# Patient Record
Sex: FEMALE | Race: WHITE | URBAN - METROPOLITAN AREA
[De-identification: names, ages, dates, MRNs, and addresses within clinical notes are randomized per-mention and may not be internally consistent; named-entity substitution may affect disease eponyms.]

---

## 2017-01-30 ENCOUNTER — OFFICE VISIT (OUTPATIENT)
Dept: INTERNAL MEDICINE CLINIC | Age: 57
End: 2017-01-30

## 2017-01-30 VITALS
DIASTOLIC BLOOD PRESSURE: 76 MMHG | BODY MASS INDEX: 29.51 KG/M2 | WEIGHT: 188 LBS | HEIGHT: 67 IN | TEMPERATURE: 98.4 F | OXYGEN SATURATION: 98 % | RESPIRATION RATE: 18 BRPM | SYSTOLIC BLOOD PRESSURE: 132 MMHG | HEART RATE: 88 BPM

## 2017-01-30 DIAGNOSIS — R60.9 EDEMA, UNSPECIFIED TYPE: Primary | ICD-10-CM

## 2017-01-30 DIAGNOSIS — R53.81 MALAISE: ICD-10-CM

## 2017-01-30 DIAGNOSIS — F98.8 ADD (ATTENTION DEFICIT DISORDER): ICD-10-CM

## 2017-01-30 RX ORDER — DEXTROAMPHETAMINE SACCHARATE, AMPHETAMINE ASPARTATE, DEXTROAMPHETAMINE SULFATE AND AMPHETAMINE SULFATE 5; 5; 5; 5 MG/1; MG/1; MG/1; MG/1
TABLET ORAL
Qty: 60 TAB | Refills: 0 | Status: SHIPPED | OUTPATIENT
Start: 2017-03-27

## 2017-01-30 RX ORDER — DEXTROAMPHETAMINE SACCHARATE, AMPHETAMINE ASPARTATE, DEXTROAMPHETAMINE SULFATE AND AMPHETAMINE SULFATE 5; 5; 5; 5 MG/1; MG/1; MG/1; MG/1
TABLET ORAL
Qty: 60 TAB | Refills: 0 | Status: SHIPPED | OUTPATIENT
Start: 2017-02-27

## 2017-01-30 RX ORDER — DEXTROAMPHETAMINE SACCHARATE, AMPHETAMINE ASPARTATE, DEXTROAMPHETAMINE SULFATE AND AMPHETAMINE SULFATE 5; 5; 5; 5 MG/1; MG/1; MG/1; MG/1
TABLET ORAL
Qty: 60 TAB | Refills: 0 | Status: SHIPPED | OUTPATIENT
Start: 2017-01-30

## 2017-01-30 NOTE — MR AVS SNAPSHOT
Visit Information Date & Time Provider Department Dept. Phone Encounter #  
 1/30/2017  5:30 PM Felisha Man PA-C [facility] [phone] [id number] Follow-up Instructions Return in about 3 months (around 4/30/2017) for ADD. Upcoming Health Maintenance Date Due  
 BREAST CANCER SCRN MAMMOGRAM 1/30/2019 PAP AKA CERVICAL CYTOLOGY 1/30/2020 DTaP/Tdap/Td series (2 - Td) 6/14/2026 COLONOSCOPY 1/30/2027 Allergies as of 1/30/2017  Review Complete On: 1/30/2017 By: Felisha Man PA-C No Known Allergies Current Immunizations  Never Reviewed No immunizations on file. Not reviewed this visit You Were Diagnosed With   
  
 Codes Comments Edema, unspecified type    -  Primary ICD-10-CM: R60.9 ICD-9-CM: 782.3 ADD (attention deficit disorder)     ICD-10-CM: F98.8 ICD-9-CM: 314.00 Malaise     ICD-10-CM: R53.81 ICD-9-CM: 780.79 Vitals BP Pulse Temp Resp Height(growth percentile) Weight(growth percentile) 132/76 (BP 1 Location: Left arm, BP Patient Position: Sitting) 88 98.4 °F (36.9 °C) (Oral) 18 5' 7\" (1.702 m) 188 lb (85.3 kg) SpO2 BMI OB Status Smoking Status 98% 29.44 kg/m2 Hysterectomy Current Every Day Smoker BMI and BSA Data Body Mass Index Body Surface Area  
 29.44 kg/m 2 2.01 m 2 Preferred Pharmacy Pharmacy Name Phone [facility] [phone]) [phone] Your Updated Medication List  
  
   
This list is accurate as of: 1/30/17  6:18 PM.  Always use your most recent med list.  
  
  
  
  
 * dextroamphetamine-amphetamine 20 mg tablet Commonly known as:  ADDERALL Earliest Fill Date: 1/30/17. Take 1/2 to 1 tablet BID * dextroamphetamine-amphetamine 20 mg tablet Commonly known as:  ADDERALL Earliest Fill Date: 2/27/17. Take 1/2-1 tab PO bid  
Start taking on:  2/27/2017 * dextroamphetamine-amphetamine 20 mg tablet Commonly known as:  ADDERALL Earliest Fill Date: 3/27/17. Take 1/2-1 tab PO bid  
Start taking on:  3/27/2017 * Notice: This list has 3 medication(s) that are the same as other medications prescribed for you. Read the directions carefully, and ask your doctor or other care provider to review them with you. Prescriptions Printed Refills  
 dextroamphetamine-amphetamine (ADDERALL) 20 mg tablet 0 Sig: [facility]: 1/30[facility]17. Take 1/2 to 1 tablet BID Class: Print  
 dextroamphetamine-amphetamine (ADDERALL) 20 mg tablet 0 Starting on: 2/27/2017 Sig: Earliest [facility] Date: 2/27/17. Take 1/2-1 tab PO bid Class: Print  
 dextroamphetamine-amphetamine (ADDERALL) 20 mg tablet 0 Starting on: 3/27/2017 Sig: Earliest [facility] Date: 3/27/17. Take 1/2-1 tab PO bid Class: Print Follow-up Instructions Return in about 3 months (around 4/30/2017) for ADD. To-Do List   
 01/30/2017 Lab:  CBC WITH AUTOMATED DIFF   
  
 01/30/2017 Lab:  LIPID PANEL   
  
 01/30/2017 Lab:  METABOLIC PANEL, COMPREHENSIVE   
  
 01/30/2017 Lab:  T4, FREE   
  
 01/30/2017 Lab:  TSH 3RD GENERATION Patient Instructions Attention Deficit Hyperactivity Disorder (ADHD) in Adults: Care Instructions Your Care Instructions Attention deficit hyperactivity disorder, or ADHD, is a condition that makes it hard to pay attention. So you may have problems when you try to focus, get organized, and finish tasks. It might make you more active than other people. Or you might do things without thinking first. 
ADHD is very common. It usually starts in early childhood. Many adults don't realize they have it until their children are diagnosed. Then they become aware of their own symptoms. Doctors don't know what causes ADHD. But it often runs in families. ADHD can be treated with medicines, behavior training, and counseling. Treatment can improve your life. Follow-up care is a key part of your treatment and safety. Be sure to make and go to all appointments, and call your doctor if you are having problems. It's also a good idea to know your test results and keep a list of the medicines you take. How can you care for yourself at home? · Learn all you can about ADHD. This will help you and your family understand it better. · Take your medicines exactly as prescribed. Call your doctor if you think you are having a problem with your medicine. You will get more details on the specific medicines your doctor prescribes. · If you miss a dose of your medicine, do not take an extra dose. · If your doctor suggests counseling, find a counselor you like and trust. Talk openly and honestly. Be willing to make some changes. · Find a support group for adults with ADHD. Talking to others with the same problems can help you feel better. It can also give you ideas about how to best cope with the condition. · Get rid of distractions at your work space. Keep your desk clean. Try not to face a window or busy hallway. · Use files, planners, and other tools to keep you organized. · Limit use of alcohol, and do not use illegal drugs. People with ADHD tend to become addicted more easily than others. Tell your doctor if you need help to quit. Counseling, support groups, and sometimes medicines can help you stay free of alcohol or drugs. · Get at least 30 minutes of physical activity on most days of the week. Exercise has been shown to help people cope with ADHD. Walking is a good choice. You also may want to do other activities, such as running, swimming, cycling, or playing tennis or team sports. When should you call for help? Watch closely for changes in your health, and be sure to contact your doctor if: 
· You feel sad a lot or cry all the time. · You have trouble sleeping, or you sleep too much. · You find it hard to concentrate, make decisions, or remember things. · You change how you normally eat. · You feel guilty for no reason. Where can you learn more? Go to http://cresencio-cachorro.info/. Enter B196 in the search box to learn more about \"Attention Deficit Hyperactivity Disorder (ADHD) in Adults: Care Instructions. \" Current as of: July 26, 2016 Content Version: 11.1 © 6779-6063 [employer] [place]. Care instructions adapted under license by [employer] (which disclaims liability or warranty for this information). If you have questions about a medical condition or this instruction, always ask your healthcare professional. [address] any warranty or liability for your use of this information. Introducing [facility]! Eulalia Gordon introduces [facility] patient portal. Now you can access parts of your medical record, email your doctor's office, and request medication refills online. 1. In your internet browser, go to [email or web address] 2. Click on the First Time User? Click Here link in the Sign In box. You will see the New Member Sign Up page. 3. Enter your [facility] Access Code exactly as it appears below. You will not need to use this code after youve completed the sign-up process. If you do not sign up before the expiration date, you must request a new code. · [facility] Access Code: [email or web address] Expires: 4/30/2017  6:12 PM 
 
4. Enter the last four digits of your Social Security Number (xxxx) and Date of Birth (mm/dd/yyyy) as indicated and click Submit. You will be taken to the next sign-up page. 5. Create a [facility] ID. This will be your [facility] login ID and cannot be changed, so think of one that is secure and easy to remember. 6. Create a [facility] password. You can change your password at any time. 7. Enter your Password Reset Question and Answer.  This can be used at a later time if you forget your password. 8. Enter your e-mail address. You will receive e-mail notification when new information is available in [address] 9. Click Sign Up. You can now view and download portions of your medical record. 10. Click the Download Summary menu link to download a portable copy of your medical information. If you have questions, please visit the Frequently Asked Questions section of the [employer] website. Remember, [employer] is NOT to be used for urgent needs. For medical emergencies, dial 911. Now available from your iPhone and Android! Please provide this summary of care documentation to your next provider. Your primary care clinician is listed as Dalila Khan. If you have any questions after today's visit, please call [phone].

## 2017-01-30 NOTE — PROGRESS NOTES
Alec Choe presents today at the clinic for. Chief Complaint   Patient presents with    Attention Deficit Disorder    Constipation    Swelling    Medication Refill        Wt Readings from Last 3 Encounters:   01/30/17 188 lb (85.3 kg)   10/05/16 183 lb (83 kg)   06/14/16 183 lb (83 kg)     Temp Readings from Last 3 Encounters:   01/30/17 98.4 °F (36.9 °C) (Oral)   10/05/16 98.1 °F (36.7 °C) (Oral)   06/14/16 98.6 °F (37 °C) (Oral)     BP Readings from Last 3 Encounters:   01/30/17 132/76   10/05/16 116/67   06/14/16 101/67     Pulse Readings from Last 3 Encounters:   01/30/17 88   10/05/16 78   06/14/16 73       There are no preventive care reminders to display for this patient. Coordination of Care:    1. Have you been to the ER, urgent care clinic since your last visit? Hospitalized since your last visit? No    2. Have you seen or consulted any other health care providers outside of the [address] [place] [address] since your last visit? Include any pap smears or colon screening.  No

## 2017-01-30 NOTE — PATIENT INSTRUCTIONS
Attention Deficit Hyperactivity Disorder (ADHD) in Adults: Care Instructions  Your Care Instructions  Attention deficit hyperactivity disorder, or ADHD, is a condition that makes it hard to pay attention. So you may have problems when you try to focus, get organized, and finish tasks. It might make you more active than other people. Or you might do things without thinking first.  ADHD is very common. It usually starts in early childhood. Many adults don't realize they have it until their children are diagnosed. Then they become aware of their own symptoms. Doctors don't know what causes ADHD. But it often runs in families. ADHD can be treated with medicines, behavior training, and counseling. Treatment can improve your life. Follow-up care is a key part of your treatment and safety. Be sure to make and go to all appointments, and call your doctor if you are having problems. It's also a good idea to know your test results and keep a list of the medicines you take. How can you care for yourself at home? · Learn all you can about ADHD. This will help you and your family understand it better. · Take your medicines exactly as prescribed. Call your doctor if you think you are having a problem with your medicine. You will get more details on the specific medicines your doctor prescribes. · If you miss a dose of your medicine, do not take an extra dose. · If your doctor suggests counseling, find a counselor you like and trust. Talk openly and honestly. Be willing to make some changes. · Find a support group for adults with ADHD. Talking to others with the same problems can help you feel better. It can also give you ideas about how to best cope with the condition. · Get rid of distractions at your work space. Keep your desk clean. Try not to face a window or busy hallway. · Use files, planners, and other tools to keep you organized. · Limit use of alcohol, and do not use illegal drugs.  People with ADHD tend to become addicted more easily than others. Tell your doctor if you need help to quit. Counseling, support groups, and sometimes medicines can help you stay free of alcohol or drugs. · Get at least 30 minutes of physical activity on most days of the week. Exercise has been shown to help people cope with ADHD. Walking is a good choice. You also may want to do other activities, such as running, swimming, cycling, or playing tennis or team sports. When should you call for help? Watch closely for changes in your health, and be sure to contact your doctor if:  · You feel sad a lot or cry all the time. · You have trouble sleeping, or you sleep too much. · You find it hard to concentrate, make decisions, or remember things. · You change how you normally eat. · You feel guilty for no reason. Where can you learn more? Go to http://cresencio-cachorro.info/. Enter B196 in the search box to learn more about \"Attention Deficit Hyperactivity Disorder (ADHD) in Adults: Care Instructions. \"  Current as of: July 26, 2016  Content Version: 11.1  © 9391-8841 [employer]. Care instructions adapted under license by [employer] (which disclaims liability or warranty for this information). If you have questions about a medical condition or this instruction, always ask your healthcare professional. [address] any warranty or liability for your use of this information.

## 2017-01-30 NOTE — PROGRESS NOTES
HISTORY OF PRESENT ILLNESS  Tammy Alphia Dancer is a 64 y.o. female. HPI   Pt is here for refill on her adderall. Pt is doing well on current dose and denies CP, SOB, palpitations, anxiety, insomnia, and weight changes. Pt has noticed some lower ext edema and increased malaise. Denies GUAJARDO, SOB, CP, cough, weight changes, and orthopnea. Pt has no insurance and would like labs printed so she can go to [facility].     No Known Allergies    Current Outpatient Prescriptions   Medication Sig    dextroamphetamine-amphetamine (ADDERALL) 20 mg tablet Earliest Fill Date: 1/30/17. Take 1/2 to 1 tablet BID    [START ON 2/27/2017] dextroamphetamine-amphetamine (ADDERALL) 20 mg tablet Earliest Fill Date: 2/27/17. Take 1/2-1 tab PO bid    [START ON 3/27/2017] dextroamphetamine-amphetamine (ADDERALL) 20 mg tablet Earliest Fill Date: 3/27/17. Take 1/2-1 tab PO bid     No current facility-administered medications for this visit. Review of Systems   Constitutional: Positive for malaise/fatigue. Negative for chills and fever. HENT: Negative. Negative for congestion, ear pain, sore throat and tinnitus. Eyes: Negative. Negative for blurred vision, double vision and photophobia. Respiratory: Negative. Negative for cough, shortness of breath and wheezing. Cardiovascular: Positive for leg swelling. Negative for chest pain and palpitations. Gastrointestinal: Negative. Negative for abdominal pain, heartburn, nausea and vomiting. Genitourinary: Negative. Negative for dysuria, frequency, hematuria and urgency. Musculoskeletal: Negative. Negative for back pain, joint pain, myalgias and neck pain. Skin: Negative. Negative for itching and rash. Neurological: Negative. Negative for dizziness, tingling, tremors and headaches. Psychiatric/Behavioral: Negative. Negative for depression and memory loss. The patient is not nervous/anxious and does not have insomnia.       Visit Vitals    /76 (BP 1 Location: Left arm, BP Patient Position: Sitting)    Pulse 88    Temp 98.4 °F (36.9 °C) (Oral)    Resp 18    Ht 5' 7\" (1.702 m)    Wt 188 lb (85.3 kg)    SpO2 98%    BMI 29.44 kg/m2       Physical Exam   Constitutional: She is oriented to person, place, and time. She appears well-developed and well-nourished. HENT:   Head: Normocephalic and atraumatic. Right Ear: Tympanic membrane normal.   Left Ear: Tympanic membrane normal.   Nose: Right sinus exhibits no maxillary sinus tenderness and no frontal sinus tenderness. Left sinus exhibits no maxillary sinus tenderness and no frontal sinus tenderness. Mouth/Throat: No posterior oropharyngeal erythema. Cardiovascular: Normal rate, regular rhythm and normal heart sounds. Exam reveals no gallop and no friction rub. No murmur heard. Pulmonary/Chest: Effort normal and breath sounds normal. No respiratory distress. She has no wheezes. She has no rales. Neurological: She is alert and oriented to person, place, and time. Skin: Skin is warm and dry. Psychiatric: She has a normal mood and affect. Her behavior is normal.       ASSESSMENT and PLAN    ICD-10-CM ICD-9-CM    1. Edema, unspecified type V57.7 273.7 METABOLIC PANEL, COMPREHENSIVE      TSH 3RD GENERATION      T4, FREE      CBC WITH AUTOMATED DIFF      LIPID PANEL   2. ADD (attention deficit disorder) F98.8 314.00 dextroamphetamine-amphetamine (ADDERALL) 20 mg tablet      dextroamphetamine-amphetamine (ADDERALL) 20 mg tablet      dextroamphetamine-amphetamine (ADDERALL) 20 mg tablet   3. Malaise J66.86 801.81 METABOLIC PANEL, COMPREHENSIVE      TSH 3RD GENERATION      T4, FREE      CBC WITH AUTOMATED DIFF      LIPID PANEL     Follow-up Disposition:  Return in about 3 months (around 4/30/2017) for ADD. Pt expressed understanding of visit summary and plans for any follow ups or referrals as well as any medications prescribed.

## 2017-02-01 ENCOUNTER — TELEPHONE (OUTPATIENT)
Dept: INTERNAL MEDICINE CLINIC | Age: 57
End: 2017-02-01

## 2017-05-26 DIAGNOSIS — F98.8 ADD (ATTENTION DEFICIT DISORDER): ICD-10-CM

## 2017-05-26 NOTE — TELEPHONE ENCOUNTER
She was last seen in Jan and was given 3 hard copies so this is correct her last one would be for March. She was given one for Jan, Feb and March she needs to come in every 3 months.

## 2017-05-26 NOTE — TELEPHONE ENCOUNTER
Pt states she can not fill script because its  dated for march pt is in [place] to take care of mother who just passed. [address] states she would need hard copy.  Please mail to [phone] P # [phone]

## 2017-05-30 RX ORDER — DEXTROAMPHETAMINE SACCHARATE, AMPHETAMINE ASPARTATE, DEXTROAMPHETAMINE SULFATE AND AMPHETAMINE SULFATE 5; 5; 5; 5 MG/1; MG/1; MG/1; MG/1
TABLET ORAL
Qty: 60 TAB | Refills: 0 | OUTPATIENT
Start: 2017-05-30

## 2017-06-02 ENCOUNTER — TELEPHONE (OUTPATIENT)
Dept: INTERNAL MEDICINE CLINIC | Age: 57
End: 2017-06-02

## 2017-06-02 NOTE — TELEPHONE ENCOUNTER
Received records request to [facility]  [address] [phone]

## 2017-06-09 RX ORDER — DEXTROAMPHETAMINE SACCHARATE, AMPHETAMINE ASPARTATE, DEXTROAMPHETAMINE SULFATE AND AMPHETAMINE SULFATE 5; 5; 5; 5 MG/1; MG/1; MG/1; MG/1
TABLET ORAL
Qty: 60 TAB | Refills: 0 | Status: CANCELLED | OUTPATIENT
Start: 2017-06-09

## 2017-06-09 NOTE — TELEPHONE ENCOUNTER
Pt called was read msg  and pt continued to dispute your answer to filling her Medication.  Please call pt [phone]

## 2017-06-09 NOTE — TELEPHONE ENCOUNTER
Pt whop receive Adderall have to be seen at least every 3 months she must be seen for her medication to be refilled no matter who we are refilling the medication with it is still a required appointment

## 2017-06-09 NOTE — TELEPHONE ENCOUNTER
Pt called states we can send refill by [facility] to  [facility] [address] [place] [address], [address]  pls call pt if questions [phone] or to let know status of request.

## 2017-06-12 NOTE — TELEPHONE ENCOUNTER
Faxed over last two office notes to the provider she is seeing up there and they will continue her medication management